# Patient Record
Sex: MALE | Race: WHITE | URBAN - METROPOLITAN AREA
[De-identification: names, ages, dates, MRNs, and addresses within clinical notes are randomized per-mention and may not be internally consistent; named-entity substitution may affect disease eponyms.]

---

## 2017-03-28 ENCOUNTER — OFFICE VISIT (OUTPATIENT)
Dept: FAMILY MEDICINE | Facility: CLINIC | Age: 42
End: 2017-03-28

## 2017-03-28 VITALS
HEIGHT: 72 IN | TEMPERATURE: 98 F | SYSTOLIC BLOOD PRESSURE: 128 MMHG | WEIGHT: 191 LBS | BODY MASS INDEX: 25.87 KG/M2 | DIASTOLIC BLOOD PRESSURE: 64 MMHG | HEART RATE: 60 BPM

## 2017-03-28 DIAGNOSIS — H93.8X3 EAR PRESSURE, BILATERAL: Primary | ICD-10-CM

## 2017-03-28 PROCEDURE — 99202 OFFICE O/P NEW SF 15 MIN: CPT | Performed by: PHYSICIAN ASSISTANT

## 2017-03-28 NOTE — MR AVS SNAPSHOT
After Visit Summary   3/28/2017    Efrain Garcia    MRN: [id number]           Patient Information     Date Of Birth          1975        Visit Information        Provider Department      3/28/2017 2:15 PM Court Brito PA-C [facility]        Today's Diagnoses     Ear pressure, bilateral    -  1       Follow-ups after your visit        Additional Services     OTOLARYNGOLOGY REFERRAL       Your provider has referred you to: N: [facility] [place] [phone]   [email or web address]    Noa will fax. Pt will call to schedule tomorrow.    Please be aware that coverage of these services is subject to the terms and limitations of your health insurance plan.  Call member services at your health plan with any benefit or coverage questions.      Please bring the following with you to your appointment:    (1) Any X-Rays, CTs or MRIs which have been performed.  Contact the facility where they were done to arrange for  prior to your scheduled appointment.   (2) List of current medications  (3) This referral request   (4) Any documents/labs given to you for this referral                  Follow-up notes from your care team     Return if symptoms worsen or fail to improve.      Who to contact     If you have questions or need follow up information about today's clinic visit or your schedule please contact [facility] directly at [phone].  Normal or non-critical lab and imaging results will be communicated to you by MyChart, letter or phone within 4 business days after the clinic has received the results. If you do not hear from us within 7 days, please contact the clinic through MyChart or phone. If you have a critical or abnormal lab result, we will notify you by phone as soon as possible.  Submit refill requests through [employer] or call your pharmacy and they will forward the refill request to us.  "Please allow 3 business days for your refill to be completed.          Additional Information About Your Visit        MyChart Information     [employer] gives you secure access to your electronic health record. If you see a primary care provider, you can also send messages to your care team and make appointments. If you have questions, please call your primary care clinic.  If you do not have a primary care provider, please call [phone] and they will assist you.        Care EveryWhere ID     This is your Care EveryWhere ID. This could be used by other organizations to access your [place] medical records  [id number]        Your Vitals Were     Pulse Temperature Height BMI (Body Mass Index)          60 98  F (36.7  C) (Oral) 1.835 m (6' 0.25\") 25.73 kg/m2         Blood Pressure from Last 3 Encounters:   03/28/17 128/64   12/17/13 120/72   01/31/13 132/72    Weight from Last 3 Encounters:   03/28/17 86.6 kg (191 lb)   12/17/13 90.8 kg (200 lb 3.2 oz)   01/31/13 85.3 kg (188 lb)              We Performed the Following     OTOLARYNGOLOGY REFERRAL        Primary Care Provider Office Phone # Fax #    STEFAN Dominguez [phone]       [facility]  [address] [facility] [address]        Thank you!     Thank you for choosing [facility]  for your care. Our goal is always to provide you with excellent care. Hearing back from our patients is one way we can continue to improve our services. Please take a few minutes to complete the written survey that you may receive in the mail after your visit with us. Thank you!             Your Updated Medication List - Protect others around you: Learn how to safely use, store and throw away your medicines at www.disposemymeds.org.      Notice  As of 3/28/2017 10:42 PM    You have not been prescribed any medications.      "

## 2017-03-28 NOTE — NURSING NOTE
Efrain aGrcia is here for bilateral hearing loss since this morning. Left is worse then right.    Questioned patient about current smoking habits.  Pt. has never smoked.  Body mass index is 25.89 kg/(m^2).  PULSE regular  My Chart: active  CLASSIFICATION OF OVERWEIGHT AND OBESITY BY BMI                        Obesity Class           BMI(kg/m2)  Underweight                                    < 18.5  Normal                                         18.5-24.9  Overweight                                     25.0-29.9  OBESITY                     I                  30.0-34.9                             II                 35.0-39.9  EXTREME OBESITY             III                >40                            Patient's  BMI Body mass index is 25.73 kg/(m^2).  http://hin.nhlbi.nih.gov/menuplanner/menu.cgi    Pre-visit planning  Immunizations - up to date  Colonoscopy -   Mammogram -   Asthma -   PHQ9 -    TONYA-7 -

## 2017-03-28 NOTE — PROGRESS NOTES
"CC: Hearing change    History:  Efrain is here today with change in hearing. Felt completely normal last night. Took two Aleve before bed for back pain. Went to sleep at 10. His kid woke up at 2:30 at and that time, Efrain did not have any issue. Woke up 6 AM, and when he sat up he immediately felt the fullness. Tried wiggling the jaw, clearing the ears without relief. Right ear feels full like he is \"underwater\" or \"full\". Left ear feels \"cloudy\" with an echoing noise that distorts sounds, especially music. Crackling in left ear. No history of ear problems, ear infections. Fevers, sweats, chills, nose running, dizziness.     No history of allergy problems. Mom has hearing loss from virus she got 3 years ago.     PMH, MEDICATIONS, ALLERGIES, SOCIAL AND FAMILY HISTORY in [facility] and reviewed by me personally.      ROS negative other than the symptoms noted above in the HPI.        Examination   /64 (BP Location: Left arm, Patient Position: Chair, Cuff Size: Adult Regular)  Pulse 60  Temp 98  F (36.7  C) (Oral)  Ht 1.835 m (6' 0.25\")  Wt 86.6 kg (191 lb)  BMI 25.73 kg/m2       Constitutional: Sitting comfortably, in no acute distress. Vital signs noted  Eyes: pupils equal round reactive to light and accomodation, extra ocular movements intact.  Ears: external canals and TMs free of abnormalities. Air fluid levels present  Nose: patent, without mucosal abnormalities. Moderate congestion.  Mouth and throat: without erythema or lesions of the mucosa  Neck:  no adenopathy, trachea midline and normal to palpation  Cardiovascular:  regular rate and rhythm, no murmurs, clicks, or gallops  Respiratory:  normal respiratory rate and rhythm, lungs clear to auscultation  Psychiatric: mentation appears normal and affect normal/bright        A/P    ICD-10-CM    1. Ear pressure, bilateral H93.8X3 OTOLARYNGOLOGY REFERRAL    Resistant to Flonase, decongestant.       DISCUSSION: Symptoms likely due to fluid behind ears. " Recommended:  OTC decongestant on a schedule.   Flonase 1 spray each nostril 1-2 times daily.  Call ENT to schedule for next week in case this tx plan is not succesful. Can cancel if symptoms resolve.     follow up visit: As needed    Court Brito PA-C  [facility]

## 2017-07-08 ENCOUNTER — OFFICE VISIT (OUTPATIENT)
Dept: URGENT CARE | Facility: URGENT CARE | Age: 42
End: 2017-07-08
Payer: COMMERCIAL

## 2017-07-08 VITALS
WEIGHT: 184.7 LBS | HEIGHT: 72 IN | DIASTOLIC BLOOD PRESSURE: 90 MMHG | TEMPERATURE: 97.3 F | BODY MASS INDEX: 25.02 KG/M2 | OXYGEN SATURATION: 98 % | HEART RATE: 71 BPM | SYSTOLIC BLOOD PRESSURE: 130 MMHG

## 2017-07-08 DIAGNOSIS — S41.151A DOG BITE OF ARM, RIGHT, INITIAL ENCOUNTER: Primary | ICD-10-CM
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
## 2017-07-08 PROCEDURE — 99213 OFFICE O/P EST LOW 20 MIN: CPT | Performed by: FAMILY MEDICINE

## 2017-07-08 NOTE — MR AVS SNAPSHOT
After Visit Summary   7/8/2017    Efrain Garcia    MRN: [id number]           Patient Information     Date Of Birth          1975        Visit Information        Provider Department      7/8/2017 8:10 PM Marvin Quezada MD [facility]        Today's Diagnoses     Dog bite of arm, right, initial encounter    -  1      Care Instructions    Place Bacitracin or another antibiotic ointment onto the wound before covering the wound with nonadherent gauze.      follow up if any fevers/spreading red streaks appear.        Dog Bite  A dog bite can cause a wound deep enough to break the skin. In such cases, the wound is cleaned and then closed. Sometimes, the wound is not closed completely. This is so that fluid can drain if the wound becomes infected. In addition to wound care, a tetanus shot may be given, if needed.    Home Care    Wash your hands well with soap and warm water before and after caring for the wound. This helps lower the risk of infection.    Care for the wound as directed. If a dressing was applied to the wound, be sure to change it as directed.    If the wound bleeds, place a clean, soft cloth on the wound. Then firmly apply pressure until the bleeding stops. This may take up to 5 minutes. Do not release the pressure and look at the wound during this time.    Most wounds heal within 10 days. But an infection can occur even with proper treatment. So be sure to check the wound daily for signs of infection (see below).    Antibiotics may be prescribed. These help prevent or treat infection. If you re given antibiotics, take them as directed. Also be sure to complete the medications.  Rabies Prevention  Rabies is a virus that can be carried in certain animals. These can include domestic animals such as dogs and cats. Pets fully vaccinated against rabies (2 shots) are at very low risk of infection. But because human rabies is almost always fatal, any biting pet should be confined  for 10 days as an extra precaution. In general, if there is a risk for rabies, the following steps may need to be taken:    If someone s pet dog has bitten you, it should be kept in a secure area for the next 10 days to watch for signs of illness. (If the pet owner won t allow this, contact your local animal control center.) If the dog becomes ill or dies during that time, contact your local animal control center at once so the animal may be tested for rabies. If the dog stays healthy for the next 10 days, there is no danger of rabies in the animal or you.    If a stray dog bit you, contact your local animal control center. They can give information on capture, quarantine, and animal rabies testing.    If you can t locate the animal that bit you in the next 2 days, and if rabies exists in your region, you may need to receive the rabies vaccine series. Call your health care provider right away. Or, return to the emergency department promptly.    All animal bites should be reported to the local animal control center. If you were not given a form to fill out, you can report this yourself.  Follow-up care  Follow up with your health care provider, or as directed.  When to seek medical advice  Call your health care provider right away if any of these occur:    Signs of infection:    Spreading redness or warmth from the wound    Increased pain or swelling    Fever of 100.4 F (38 C) or higher, or as directed by your health care provider    Colored fluid or pus draining from the wound    Signs of rabies infection:    Headache    Confusion    Strange behavior    Seizure    Decreased ability to move any body part near the wound    Bleeding that cannot be stopped after 5 minutes of firm pressure  Date Last Reviewed: 3/23/2015    7456-2246 [employer]. [address] [place] [address]. All rights reserved. This information is not intended as a substitute for professional medical care. Always follow your  "healthcare professional's instructions.                Follow-ups after your visit        Who to contact     If you have questions or need follow up information about today's clinic visit or your schedule please contact [facility] directly at [phone].  Normal or non-critical lab and imaging results will be communicated to you by [employer], letter or phone within 4 business days after the clinic has received the results. If you do not hear from us within 7 days, please contact the clinic through [employer] or phone. If you have a critical or abnormal lab result, we will notify you by phone as soon as possible.  Submit refill requests through [employer] or call your pharmacy and they will forward the refill request to us. Please allow 3 business days for your refill to be completed.          Additional Information About Your Visit        [employer] Information     [employer] gives you secure access to your electronic health record. If you see a primary care provider, you can also send messages to your care team and make appointments. If you have questions, please call your primary care clinic.  If you do not have a primary care provider, please call [phone] and they will assist you.        Care EveryWhere ID     This is your Care EveryWhere ID. This could be used by other organizations to access your [place] medical records  [id number]        Your Vitals Were     Pulse Temperature Height Pulse Oximetry BMI (Body Mass Index)       71 97.3  F (36.3  C) (Tympanic) 6' 0.25\" (1.835 m) 98% 24.88 kg/m2        Blood Pressure from Last 3 Encounters:   07/08/17 130/90   03/28/17 128/64   12/17/13 120/72    Weight from Last 3 Encounters:   07/08/17 184 lb 11.2 oz (83.8 kg)   03/28/17 191 lb (86.6 kg)   12/17/13 200 lb 3.2 oz (90.8 kg)              Today, you had the following     No orders found for display         Today's Medication Changes          These changes are accurate as of: 7/8/17  8:47 PM.  If you have " any questions, ask your nurse or doctor.               Start taking these medicines.        Dose/Directions    amoxicillin-clavulanate 875-125 MG per tablet   Commonly known as:  AUGMENTIN   Used for:  Dog bite of arm, right, initial encounter   Started by:  Marvin Quezada MD        Dose:  1 tablet   Take 1 tablet by mouth 2 times daily for 14 days   Quantity:  28 tablet   Refills:  0            Where to get your medicines      Some of these will need a paper prescription and others can be bought over the counter.  Ask your nurse if you have questions.     Bring a paper prescription for each of these medications     amoxicillin-clavulanate 875-125 MG per tablet                Primary Care Provider Office Phone # Fax #    STEFAN Dominguez [phone]       [facility]  [address]  [facility] [address]        Equal Access to Services     [facility]: Hadii stephan yepez hadasho Soomaali, waaxda luqadaha, qaybta kaalmada adeegyada, waxsuze cowan . So [facility] [phone].    ATENCIÓN: Si habla español, tiene a benjamin disposición servicios gratuitos de asistencia lingüística. [facility] [phone].    We comply with applicable federal civil rights laws and [place] laws. We do not discriminate on the basis of race, color, national origin, age, disability sex, sexual orientation or gender identity.            Thank you!     Thank you for choosing [facility]  for your care. Our goal is always to provide you with excellent care. Hearing back from our patients is one way we can continue to improve our services. Please take a few minutes to complete the written survey that you may receive in the mail after your visit with us. Thank you!             Your Updated Medication List - Protect others around you: Learn how to safely use, store and throw away your medicines at www.disposemymeds.org.          This list is accurate as of: 7/8/17  8:47 PM.   Always use your most recent med list.                   Brand Name Dispense Instructions for use Diagnosis    amoxicillin-clavulanate 875-125 MG per tablet    AUGMENTIN    28 tablet    Take 1 tablet by mouth 2 times daily for 14 days    Dog bite of arm, right, initial encounter

## 2017-07-09 NOTE — PATIENT INSTRUCTIONS
Place Bacitracin or another antibiotic ointment onto the wound before covering the wound with nonadherent gauze.      follow up if any fevers/spreading red streaks appear.        Dog Bite  A dog bite can cause a wound deep enough to break the skin. In such cases, the wound is cleaned and then closed. Sometimes, the wound is not closed completely. This is so that fluid can drain if the wound becomes infected. In addition to wound care, a tetanus shot may be given, if needed.    Home Care    Wash your hands well with soap and warm water before and after caring for the wound. This helps lower the risk of infection.    Care for the wound as directed. If a dressing was applied to the wound, be sure to change it as directed.    If the wound bleeds, place a clean, soft cloth on the wound. Then firmly apply pressure until the bleeding stops. This may take up to 5 minutes. Do not release the pressure and look at the wound during this time.    Most wounds heal within 10 days. But an infection can occur even with proper treatment. So be sure to check the wound daily for signs of infection (see below).    Antibiotics may be prescribed. These help prevent or treat infection. If you re given antibiotics, take them as directed. Also be sure to complete the medications.  Rabies Prevention  Rabies is a virus that can be carried in certain animals. These can include domestic animals such as dogs and cats. Pets fully vaccinated against rabies (2 shots) are at very low risk of infection. But because human rabies is almost always fatal, any biting pet should be confined for 10 days as an extra precaution. In general, if there is a risk for rabies, the following steps may need to be taken:    If someone s pet dog has bitten you, it should be kept in a secure area for the next 10 days to watch for signs of illness. (If the pet owner won t allow this, contact your local animal control center.) If the dog becomes ill or dies during that  time, contact your local animal control center at once so the animal may be tested for rabies. If the dog stays healthy for the next 10 days, there is no danger of rabies in the animal or you.    If a stray dog bit you, contact your local animal control center. They can give information on capture, quarantine, and animal rabies testing.    If you can t locate the animal that bit you in the next 2 days, and if rabies exists in your region, you may need to receive the rabies vaccine series. Call your health care provider right away. Or, return to the emergency department promptly.    All animal bites should be reported to the local animal control center. If you were not given a form to fill out, you can report this yourself.  Follow-up care  Follow up with your health care provider, or as directed.  When to seek medical advice  Call your health care provider right away if any of these occur:    Signs of infection:    Spreading redness or warmth from the wound    Increased pain or swelling    Fever of 100.4 F (38 C) or higher, or as directed by your health care provider    Colored fluid or pus draining from the wound    Signs of rabies infection:    Headache    Confusion    Strange behavior    Seizure    Decreased ability to move any body part near the wound    Bleeding that cannot be stopped after 5 minutes of firm pressure  Date Last Reviewed: 3/23/2015    8198-9869 [employer]. [address] [place] [address], [place] [address]. All rights reserved. This information is not intended as a substitute for professional medical care. Always follow your healthcare professional's instructions.

## 2017-07-09 NOTE — PROGRESS NOTES
"SUBJECTIVE:  Efrain Garcia is a 41 year old male who presents with a chief complaint of an animal bite on the right forearm and right triceps  He was bitten by a his own pet dog about 45 minutes ago.  .   Cicumstances of bite:  Patient was placing his dog into a kennel when the dog unexpectedly bit the patient's right arm. Not much bleeding.  No problems moving the right hand and fingers.  No numbness.   Animal's immunizations up to date  .  Patient has cleaned the wound with soap and water at home.     last tetanus booster within 10 years    No past medical history on file.  No current outpatient prescriptions on file.  Social History   Substance Use Topics     Smoking status: Never Smoker     Smokeless tobacco: Never Used     Alcohol use 2.0 oz/week     4 drink(s) per week       ROS:  Review of systems negative except as stated above.    OBJECTIVE:  /90  Pulse 71  Temp 97.3  F (36.3  C) (Tympanic)  Ht 6' 0.25\" (1.835 m)  Wt 184 lb 11.2 oz (83.8 kg)  SpO2 98%  BMI 24.88 kg/m2  GENERAL: healthy, alert no acute distress  SKIN: Right triceps area has four linear-shaped abrasions without dehiscence. The right  Forearm has two puncture wounds.   NEURO: Normal strength and tone, sensory exam grossly normal,  normal speech and mentation    ASSESSMENT:  Dog Bite of the right arm.     PLAN:  See orders in epic  Rx:  Augmentin  The wounds were cleaned with Hibiclens and sterile water.   Bacitracin was placed over the wounds, and the wounds were covered with nonadherent gauze.    Cover the wounds with nonadherent gauze  follow up if any spreading redness and/or fevers appear.     Marvin Quezada MD    "

## 2017-07-09 NOTE — NURSING NOTE
"Chief Complaint   Patient presents with     Dog Bite      bite by his own dog vaccines up to date// right forearm and under above elbow happened about 45 mins ago       Initial /90  Pulse 71  Temp 97.3  F (36.3  C) (Tympanic)  Ht 6' 0.25\" (1.835 m)  Wt 184 lb 11.2 oz (83.8 kg)  SpO2 98%  BMI 24.88 kg/m2 Estimated body mass index is 24.88 kg/(m^2) as calculated from the following:    Height as of this encounter: 6' 0.25\" (1.835 m).    Weight as of this encounter: 184 lb 11.2 oz (83.8 kg).  Medication Reconciliation: complete   Diane Lees MA// July 8, 2017 8:28 PM          "

## 2017-10-31 ENCOUNTER — OFFICE VISIT (OUTPATIENT)
Dept: FAMILY MEDICINE | Facility: CLINIC | Age: 42
End: 2017-10-31

## 2017-10-31 VITALS
TEMPERATURE: 98.2 F | DIASTOLIC BLOOD PRESSURE: 70 MMHG | BODY MASS INDEX: 25.31 KG/M2 | HEIGHT: 73 IN | RESPIRATION RATE: 14 BRPM | WEIGHT: 191 LBS | OXYGEN SATURATION: 98 % | HEART RATE: 76 BPM | SYSTOLIC BLOOD PRESSURE: 110 MMHG

## 2017-10-31 DIAGNOSIS — Z00.00 ROUTINE GENERAL MEDICAL EXAMINATION AT A HEALTH CARE FACILITY: Primary | ICD-10-CM

## 2017-10-31 DIAGNOSIS — Z71.89 ACP (ADVANCE CARE PLANNING): ICD-10-CM

## 2017-10-31 PROBLEM — S73.192A LABRAL TEAR OF LEFT HIP JOINT: Status: ACTIVE | Noted: 2017-10-31

## 2017-10-31 PROCEDURE — 99395 PREV VISIT EST AGE 18-39: CPT | Performed by: FAMILY MEDICINE

## 2017-10-31 PROCEDURE — 80053 COMPREHEN METABOLIC PANEL: CPT | Mod: 90 | Performed by: FAMILY MEDICINE

## 2017-10-31 PROCEDURE — 36415 COLL VENOUS BLD VENIPUNCTURE: CPT | Performed by: FAMILY MEDICINE

## 2017-10-31 PROCEDURE — 80061 LIPID PANEL: CPT | Mod: 90 | Performed by: FAMILY MEDICINE

## 2017-10-31 RX ORDER — HYDROCODONE BITARTRATE AND ACETAMINOPHEN 5; 325 MG/1; MG/1
TABLET ORAL PRN
COMMUNITY
Start: 2017-10-30

## 2017-10-31 RX ORDER — PENICILLIN V POTASSIUM 500 MG/1
1 TABLET, FILM COATED ORAL 3 TIMES DAILY
COMMUNITY
Start: 2017-10-30

## 2017-10-31 NOTE — MR AVS SNAPSHOT
"              After Visit Summary   10/31/2017    Efrain Garica    MRN: [id number]           Patient Information     Date Of Birth          1975        Visit Information        Provider Department      10/31/2017 12:30 PM Bautista, Adrian Christopher, MD [facility]        Today's Diagnoses     Routine general medical examination at a health care facility    -  1    ACP (advance care planning)           Follow-ups after your visit        Who to contact     If you have questions or need follow up information about today's clinic visit or your schedule please contact [facility] directly at [phone].  Normal or non-critical lab and imaging results will be communicated to you by [employer], letter or phone within 4 business days after the clinic has received the results. If you do not hear from us within 7 days, please contact the clinic through [employer] or phone. If you have a critical or abnormal lab result, we will notify you by phone as soon as possible.  Submit refill requests through [employer] or call your pharmacy and they will forward the refill request to us. Please allow 3 business days for your refill to be completed.          Additional Information About Your Visit        MyChart Information     [employer] gives you secure access to your electronic health record. If you see a primary care provider, you can also send messages to your care team and make appointments. If you have questions, please call your primary care clinic.  If you do not have a primary care provider, please call [phone] and they will assist you.        Care EveryWhere ID     This is your Care EveryWhere ID. This could be used by other organizations to access your [facility] medical records  [id number]        Your Vitals Were     Pulse Temperature Respirations Height Pulse Oximetry BMI (Body Mass Index)    76 98.2  F (36.8  C) (Oral) 14 1.842 m (6' 0.5\") 98% 25.55 kg/m2       Blood Pressure " from Last 3 Encounters:   10/31/17 110/70   07/08/17 130/90   03/28/17 128/64    Weight from Last 3 Encounters:   10/31/17 86.6 kg (191 lb)   07/08/17 83.8 kg (184 lb 11.2 oz)   03/28/17 86.6 kg (191 lb)              We Performed the Following     COMPREHENSIVE METABOLIC PANEL (QUEST) XCMP     Lipid Profile (QUEST)     VENOUS COLLECTION        Primary Care Provider Office Phone # Fax #    STEFAN Dominguez [phone] [address]  [facility] [address]        Equal Access to Services     [facility]: Hadii aad lucho hadasho Soarash, waaxda luqadaha, qaybta kaalmada adeegyada, do cowan . So [facility] [phone].    ATENCIÓN: Si habla español, tiene a benjamin disposición servicios gratuitos de asistencia lingüística. Llame al [phone].    We comply with applicable federal civil rights laws and [place] laws. We do not discriminate on the basis of race, color, national origin, age, disability, sex, sexual orientation, or gender identity.            Thank you!     Thank you for choosing [facility]  for your care. Our goal is always to provide you with excellent care. Hearing back from our patients is one way we can continue to improve our services. Please take a few minutes to complete the written survey that you may receive in the mail after your visit with us. Thank you!             Your Updated Medication List - Protect others around you: Learn how to safely use, store and throw away your medicines at www.disposemymeds.org.          This list is accurate as of: 10/31/17  1:12 PM.  Always use your most recent med list.                   Brand Name Dispense Instructions for use Diagnosis    HYDROcodone-acetaminophen 5-325 MG per tablet    NORCO     as needed        penicillin V potassium 500 MG tablet    VEETID     Take 1 tablet by mouth 3 times daily

## 2017-10-31 NOTE — PROGRESS NOTES
SUBJECTIVE:   CC: Efrain Garcia is an 41 year old male who presents for preventative health visit.     Healthy Habits:    Do you get at least three servings of calcium containing foods daily (dairy, green leafy vegetables, etc.)? yes    Amount of exercise or daily activities, outside of work: 3 day(s) per week    Problems taking medications regularly No    Medication side effects: No    Have you had an eye exam in the past two years? no    Do you see a dentist twice per year? yes    Do you have sleep apnea, excessive snoring or daytime drowsiness?no      Left testicle tenderness last week, seemed swollen but now gone        Today's PHQ-2 Score: PHQ-2 ( 1999 Pfizer) 10/31/2017   Q1: Little interest or pleasure in doing things 0   Q2: Feeling down, depressed or hopeless 0   PHQ-2 Score 0         Abuse: Current or Past(Physical, Sexual or Emotional)- No  Do you feel safe in your environment - Yes  Social History   Substance Use Topics     Smoking status: Never Smoker     Smokeless tobacco: Never Used     Alcohol use 2.0 oz/week     4 drink(s) per week     The patient does not drink >3 drinks per day nor >7 drinks per week.    Last PSA: No results found for: PSA    Reviewed orders with patient. Reviewed health maintenance and updated orders accordingly - Yes  BP Readings from Last 3 Encounters:   10/31/17 110/70   07/08/17 130/90   03/28/17 128/64    Wt Readings from Last 3 Encounters:   10/31/17 86.6 kg (191 lb)   07/08/17 83.8 kg (184 lb 11.2 oz)   03/28/17 86.6 kg (191 lb)                  Patient Active Problem List   Diagnosis     iamLUMBAR DISC DISPLACEMENT     Health Care Home     ACP (advance care planning)     Past Surgical History:   Procedure Laterality Date     DENTAL SURGERY  1991    2nd molars     TONSILLECTOMY & ADENOIDECTOMY  1982     VASECTOMY  2009       Social History   Substance Use Topics     Smoking status: Never Smoker     Smokeless tobacco: Never Used     Alcohol use 2.0 oz/week     4 drink(s)  per week     Family History   Problem Relation Age of Onset     High cholesterol Mother      Hyperlipidemia Mother      Psychotic Disorder Sister      post partum depression     DIABETES No family hx of      Coronary Artery Disease No family hx of      Hypertension No family hx of      Colon Cancer No family hx of          Current Outpatient Prescriptions   Medication Sig Dispense Refill     penicillin V potassium (VEETID) 500 MG tablet Take 1 tablet by mouth 3 times daily       HYDROcodone-acetaminophen (NORCO) 5-325 MG per tablet as needed       No Known Allergies  Recent Labs   Lab Test  12/20/13   0936  01/31/12   1020  03/10/10   1330   LDL  117  138*   --    HDL  62  55   --    TRIG  101  106   --    ALT  25  35   --    CR  1.15  1.08  1.01   GFRESTIMATED  81  89  86   GFRESTBLACK   --    --   >90   POTASSIUM  4.4  4.4  4.2   TSH  1.04   --    --               Reviewed and updated as needed this visit by clinical staffTobacco  Allergies  Meds  Problems         Reviewed and updated as needed this visit by Provider        No past medical history on file.   Past Surgical History:   Procedure Laterality Date     DENTAL SURGERY  1991    2nd molars     TONSILLECTOMY & ADENOIDECTOMY  1982     VASECTOMY  2009       ROS:  C: NEGATIVE for fever, chills, change in weight  I: NEGATIVE for worrisome rashes, moles or lesions  E: NEGATIVE for vision changes or irritation  ENT: NEGATIVE for ear, mouth and throat problems  R: NEGATIVE for significant cough or SOB  CV: NEGATIVE for chest pain, palpitations or peripheral edema  GI: NEGATIVE for nausea, abdominal pain, heartburn, or change in bowel habits   male: negative for dysuria, hematuria, decreased urinary stream, erectile dysfunction, urethral discharge  M: NEGATIVE for significant arthralgias or myalgia  N: NEGATIVE for weakness, dizziness or paresthesias  E: NEGATIVE for temperature intolerance, skin/hair changes  H: NEGATIVE for bleeding problems  P: NEGATIVE for  "changes in mood or affect    OBJECTIVE:   /70 (BP Location: Right arm, Cuff Size: Adult Large)  Pulse 76  Temp 98.2  F (36.8  C) (Oral)  Resp 14  Ht 1.842 m (6' 0.5\")  Wt 86.6 kg (191 lb)  SpO2 98%  BMI 25.55 kg/m2  EXAM:  GENERAL: healthy, alert and no distress  EYES: Eyes grossly normal to inspection, PERRL and conjunctivae and sclerae normal  HENT: ear canals and TM's normal, nose and mouth without ulcers or lesions  NECK: no adenopathy, no asymmetry, masses, or scars and thyroid normal to palpation  RESP: lungs clear to auscultation - no rales, rhonchi or wheezes  CV: regular rate and rhythm, normal S1 S2, no S3 or S4, no murmur, click or rub, no peripheral edema and peripheral pulses strong  ABDOMEN: soft, nontender, no hepatosplenomegaly, no masses and bowel sounds normal   (male): normal male genitalia without lesions or urethral discharge, no hernia  RECTAL (male): deferred  MS: no gross musculoskeletal defects noted, no edema  SKIN: no suspicious lesions or rashes  NEURO: Normal strength and tone, mentation intact and speech normal  PSYCH: mentation appears normal, affect normal/bright  LYMPH: no cervical, supraclavicular, axillary, or inguinal adenopathy    ASSESSMENT/PLAN:   (Z00.00) Routine general medical examination at a health care facility  (primary encounter diagnosis)  Comment: discussed preventitive healthcare   Plan: Lipid Profile (QUEST), COMPREHENSIVE METABOLIC         PANEL (QUEST) XCMP, VENOUS COLLECTION        Continue to work on healthy diet and exercise, discussed healthy habits     (Z71.89) ACP (advance care planning)  Comment:   Plan:     COUNSELING:  Reviewed preventive health counseling, as reflected in patient instructions       Regular exercise       Healthy diet/nutrition       Colon cancer screening       Prostate cancer screening           reports that he has never smoked. He has never used smokeless tobacco.      Estimated body mass index is 25.55 kg/(m^2) as " "calculated from the following:    Height as of this encounter: 1.842 m (6' 0.5\").    Weight as of this encounter: 86.6 kg (191 lb).         Counseling Resources:  ATP IV Guidelines  Pooled Cohorts Equation Calculator  FRAX Risk Assessment  ICSI Preventive Guidelines  Dietary Guidelines for Americans, 2010  USDA's MyPlate  ASA Prophylaxis  Lung CA Screening    Adrian Baum MD  [facility]  "

## 2017-10-31 NOTE — NURSING NOTE
Patient is here for a full physical exam.  Pre-Visit Screening :  Immunizations : up to date    Colonoscopy : na  Mammogram : na  Asthma Action Plan/Test : na  PHQ9/GAD7 : na  Pulse - regular  Medication Reconciliation: complete    BP done on the left arm, with a lg sized cuff.  Pulse - regular  My Chart - accepts    CLASSIFICATION OF OVERWEIGHT AND OBESITY BY BMI                         Obesity Class           BMI(kg/m2)  Underweight                                    < 18.5  Normal                                         18.5-24.9  Overweight                                     25.0-29.9  OBESITY                     I                  30.0-34.9                              II                 35.0-39.9  EXTREME OBESITY             III                >40                             Patient's  BMI Body mass index is 25.55 kg/(m^2).  http://hin.nhlbi.nih.gov/menuplanner/menu.cgi  Questioned patient about current smoking habits.  Pt. has never smoked.  ETOH screening:  Questions:  1-How often do you have a drink containing alcohol?                             3 times per week(s)  2-How many drinks containing alcohol do you have on a typical day when you are         Drinking?                              1   3- How often do you have 5 or more drinks on one occasion?                              never per never    Have you ever:  None of the patient's responses to the CAGE screening were positive / Negative CAGE score

## 2017-11-01 LAB
ALBUMIN SERPL-MCNC: 4.9 G/DL (ref 3.6–5.1)
ALBUMIN/GLOB SERPL: 1.9 (CALC) (ref 1–2.5)
ALP SERPL-CCNC: 59 U/L (ref 40–115)
ALT SERPL-CCNC: 20 U/L (ref 9–46)
AST SERPL-CCNC: 18 U/L (ref 10–40)
BILIRUB SERPL-MCNC: 1 MG/DL (ref 0.2–1.2)
BUN SERPL-MCNC: 11 MG/DL (ref 7–25)
BUN/CREATININE RATIO: NORMAL (CALC) (ref 6–22)
CALCIUM SERPL-MCNC: 9.6 MG/DL (ref 8.6–10.3)
CHLORIDE SERPLBLD-SCNC: 103 MMOL/L (ref 98–110)
CHOLEST SERPL-MCNC: 216 MG/DL
CHOLEST/HDLC SERPL: 2.8 (CALC)
CO2 SERPL-SCNC: 25 MMOL/L (ref 20–31)
CREAT SERPL-MCNC: 0.96 MG/DL (ref 0.6–1.35)
EGFR AFRICAN AMERICAN - QUEST: 115 ML/MIN/1.73M2
GFR SERPL CREATININE-BSD FRML MDRD: 99 ML/MIN/1.73M2
GLOBULIN, CALCULATED - QUEST: 2.6 G/DL (CALC) (ref 1.9–3.7)
GLUCOSE - QUEST: 78 MG/DL (ref 65–99)
HDLC SERPL-MCNC: 78 MG/DL
LDLC SERPL CALC-MCNC: 122 MG/DL (CALC)
NONHDLC SERPL-MCNC: 138 MG/DL (CALC)
POTASSIUM SERPL-SCNC: 4.1 MMOL/L (ref 3.5–5.3)
PROT SERPL-MCNC: 7.5 G/DL (ref 6.1–8.1)
SODIUM SERPL-SCNC: 140 MMOL/L (ref 135–146)
TRIGL SERPL-MCNC: 66 MG/DL

## 2018-12-25 ENCOUNTER — OFFICE VISIT (OUTPATIENT)
Dept: URGENT CARE | Facility: URGENT CARE | Age: 43
End: 2018-12-25
Payer: COMMERCIAL

## 2018-12-25 VITALS
TEMPERATURE: 98 F | OXYGEN SATURATION: 98 % | HEART RATE: 65 BPM | DIASTOLIC BLOOD PRESSURE: 72 MMHG | RESPIRATION RATE: 16 BRPM | SYSTOLIC BLOOD PRESSURE: 118 MMHG | BODY MASS INDEX: 26.39 KG/M2 | WEIGHT: 197.3 LBS

## 2018-12-25 DIAGNOSIS — H00.014 HORDEOLUM EXTERNUM OF LEFT UPPER EYELID: Primary | ICD-10-CM

## 2018-12-25 PROCEDURE — 99213 OFFICE O/P EST LOW 20 MIN: CPT | Performed by: PHYSICIAN ASSISTANT

## 2018-12-25 RX ORDER — POLYMYXIN B SULFATE AND TRIMETHOPRIM 1; 10000 MG/ML; [USP'U]/ML
1-2 SOLUTION OPHTHALMIC EVERY 4 HOURS
Qty: 5 ML | Refills: 0 | Status: SHIPPED | OUTPATIENT
Start: 2018-12-25 | End: 2019-01-01

## 2018-12-25 ASSESSMENT — ENCOUNTER SYMPTOMS
EYE DISCHARGE: 1
HEADACHES: 0
EYE REDNESS: 1
PHOTOPHOBIA: 0
EYE PAIN: 1
EYE ITCHING: 0
FEVER: 0

## 2018-12-25 NOTE — PROGRESS NOTES
SUBJECTIVE:   Efrain Garcia is a 43 year old male presenting with a chief complaint of   Chief Complaint   Patient presents with     Urgent Care     Eye Problem     LT eye- was sawing wood yesterday without eye protection. Eye started irritating yesterday. c/o redness, swelling, discharge       He is a new patient of [facility].    Eye Problem    Onset of symptoms was 1 day(s) ago.   Location: left eye   Course of illness is worsening.    Severity moderate  Current and Associated symptoms: discharge, pain, redness, eyelid swelling  Treatment measures tried include flushed with water   Context: Possible foreign body  Was cutting wood and also had shaved a piece of metal with . No glasses.     Review of Systems   Constitutional: Negative for fever.   Eyes: Positive for pain, discharge and redness. Negative for photophobia, itching and visual disturbance.   Neurological: Negative for headaches.       History reviewed. No pertinent past medical history.  Family History   Problem Relation Age of Onset     High cholesterol Mother      Hyperlipidemia Mother      Psychotic Disorder Sister         post partum depression     Diabetes No family hx of      Coronary Artery Disease No family hx of      Hypertension No family hx of      Colon Cancer No family hx of      Current Outpatient Medications   Medication Sig Dispense Refill     trimethoprim-polymyxin b (POLYTRIM) 65656-9.1 UNIT/ML-% ophthalmic solution Place 1-2 drops Into the left eye every 4 hours for 7 days 5 mL 0     HYDROcodone-acetaminophen (NORCO) 5-325 MG per tablet as needed       penicillin V potassium (VEETID) 500 MG tablet Take 1 tablet by mouth 3 times daily       Social History     Tobacco Use     Smoking status: Never Smoker     Smokeless tobacco: Never Used   Substance Use Topics     Alcohol use: Yes     Alcohol/week: 2.0 oz     Types: 4 drink(s) per week       OBJECTIVE  /72 (BP Location: Right arm, Patient Position: Sitting, Cuff Size: Adult  Large)   Pulse 65   Temp 98  F (36.7  C) (Tympanic)   Resp 16   Wt 89.5 kg (197 lb 4.8 oz)   SpO2 98%   BMI 26.39 kg/m      Physical Exam   Eyes: EOM are normal. Pupils are equal, round, and reactive to light. Lids are everted and swept, no foreign bodies found. Left eye exhibits discharge and hordeolum. Left eye exhibits no chemosis and no exudate. No foreign body present in the left eye. Right conjunctiva is not injected. Right conjunctiva has no hemorrhage. Left conjunctiva is injected. Left conjunctiva has no hemorrhage.   Use of fluorescein dye- no corneal abrasion or ulcerations- maybe a nancy [place] fb that floated out of eye    Labs:  No results found for this or any previous visit (from the past 24 hour(s)).    X-Ray was not done.    ASSESSMENT:      ICD-10-CM    1. Hordeolum externum of left upper eyelid H00.014 trimethoprim-polymyxin b (POLYTRIM) 13662-3.1 UNIT/ML-% ophthalmic solution        Medical Decision Making:    Differential Diagnosis:  Eye Problem: Stye (external)    Serious Comorbid Conditions:  Adult:  None    PLAN:    Eye Problem: Polytrim ophthalmic drops-1-2 drops in the affected eye(s) every 4 hours while awake for 3 days. Return to clinic in 24 hours if persistent foreign body sensation.    Followup:    If not improving or if condition worsens, follow up with your Primary Care Provider    There are no Patient Instructions on file for this visit.

## 2020-02-16 ENCOUNTER — HEALTH MAINTENANCE LETTER (OUTPATIENT)
Age: 45
End: 2020-02-16

## 2020-11-29 ENCOUNTER — HEALTH MAINTENANCE LETTER (OUTPATIENT)
Age: 45
End: 2020-11-29

## 2021-04-10 ENCOUNTER — HEALTH MAINTENANCE LETTER (OUTPATIENT)
Age: 46
End: 2021-04-10

## 2021-09-25 ENCOUNTER — HEALTH MAINTENANCE LETTER (OUTPATIENT)
Age: 46
End: 2021-09-25

## 2022-05-01 ENCOUNTER — HEALTH MAINTENANCE LETTER (OUTPATIENT)
Age: 47
End: 2022-05-01

## 2022-12-25 ENCOUNTER — HEALTH MAINTENANCE LETTER (OUTPATIENT)
Age: 47
End: 2022-12-25

## 2023-06-02 ENCOUNTER — HEALTH MAINTENANCE LETTER (OUTPATIENT)
Age: 48
End: 2023-06-02

## 2025-06-21 ENCOUNTER — HEALTH MAINTENANCE LETTER (OUTPATIENT)
Age: 50
End: 2025-06-21